# Patient Record
Sex: FEMALE | Race: BLACK OR AFRICAN AMERICAN | NOT HISPANIC OR LATINO | URBAN - METROPOLITAN AREA
[De-identification: names, ages, dates, MRNs, and addresses within clinical notes are randomized per-mention and may not be internally consistent; named-entity substitution may affect disease eponyms.]

---

## 2022-09-22 ENCOUNTER — WEB ENCOUNTER (OUTPATIENT)
Dept: URBAN - METROPOLITAN AREA CLINIC 88 | Facility: CLINIC | Age: 52
End: 2022-09-22

## 2022-09-27 ENCOUNTER — OFFICE VISIT (OUTPATIENT)
Dept: URBAN - METROPOLITAN AREA CLINIC 88 | Facility: CLINIC | Age: 52
End: 2022-09-27
Payer: COMMERCIAL

## 2022-09-27 ENCOUNTER — LAB OUTSIDE AN ENCOUNTER (OUTPATIENT)
Dept: URBAN - METROPOLITAN AREA CLINIC 88 | Facility: CLINIC | Age: 52
End: 2022-09-27

## 2022-09-27 ENCOUNTER — DASHBOARD ENCOUNTERS (OUTPATIENT)
Age: 52
End: 2022-09-27

## 2022-09-27 VITALS
TEMPERATURE: 97.3 F | HEIGHT: 64 IN | SYSTOLIC BLOOD PRESSURE: 85 MMHG | DIASTOLIC BLOOD PRESSURE: 61 MMHG | BODY MASS INDEX: 33.63 KG/M2 | WEIGHT: 197 LBS | HEART RATE: 97 BPM

## 2022-09-27 DIAGNOSIS — Z12.11 COLON CANCER SCREENING: ICD-10-CM

## 2022-09-27 DIAGNOSIS — K59.00 CONSTIPATION, UNSPECIFIED CONSTIPATION TYPE: ICD-10-CM

## 2022-09-27 PROBLEM — 14760008: Status: ACTIVE | Noted: 2022-09-27

## 2022-09-27 PROCEDURE — 99203 OFFICE O/P NEW LOW 30 MIN: CPT | Performed by: REGISTERED NURSE

## 2022-09-27 RX ORDER — MECLIZINE HYDROCLORIDE 25 MG/1
TAKE 1 TABLET BY MOUTH THREE TIMES A DAY AS NEEDED TABLET ORAL
Qty: 30 EACH | Refills: 0 | Status: DISCONTINUED | COMMUNITY

## 2022-09-27 RX ORDER — FUROSEMIDE 20 MG/1
TABLET ORAL
Qty: 30 TABLET | Status: DISCONTINUED | COMMUNITY

## 2022-09-27 RX ORDER — VALACYCLOVIR 1 G/1
TAKE 1 TABLET BY MOUTH TWICE DAILY FOR 3 DAYS THEN TAKE ONCE DAILY TABLET, FILM COATED ORAL
Qty: 90 EACH | Refills: 0 | Status: DISCONTINUED | COMMUNITY

## 2022-09-27 RX ORDER — ATORVASTATIN CALCIUM 20 MG/1
TABLET ORAL
Qty: 90 TABLET | Status: ACTIVE | COMMUNITY

## 2022-09-27 RX ORDER — TOBRAMYCIN AND DEXAMETHASONE 3; 1 MG/ML; MG/ML
SUSPENSION/ DROPS OPHTHALMIC
Qty: 10 UNSPECIFIED | Status: ACTIVE | COMMUNITY

## 2022-09-27 RX ORDER — OLMESARTAN MEDOXOMIL, AMLODIPINE AND HYDROCHLOROTHIAZIDE 40; 10; 25 MG/1; MG/1; MG/1
TAKE 1 TABLET BY MOUTH DAILY TABLET, FILM COATED ORAL
Qty: 90 EACH | Refills: 0 | Status: ACTIVE | COMMUNITY

## 2022-09-27 RX ORDER — THYROID, PORCINE 60 MG/1
TAKE 1 TABLET BY MOUTH EVERY MORNING TABLET ORAL
Qty: 30 EACH | Refills: 0 | Status: ACTIVE | COMMUNITY

## 2022-09-27 RX ORDER — LEVOTHYROXINE SODIUM 0.07 MG/1
TABLET ORAL
Qty: 90 EACH | Refills: 0 | Status: ACTIVE | COMMUNITY

## 2022-09-27 NOTE — HPI-TODAY'S VISIT:
Pt presents for colon cancer screening. No prior colonoscopy. No family history of colon cancer. Pt denies any abdominal pain, diarrhea, rectal bleeding or weight loss. She has occasional constipation. Bowels typically move every 1-2 days.

## 2022-10-11 ENCOUNTER — OFFICE VISIT (OUTPATIENT)
Dept: URBAN - METROPOLITAN AREA SURGERY CENTER 23 | Facility: SURGERY CENTER | Age: 52
End: 2022-10-11

## 2022-11-09 ENCOUNTER — TELEPHONE ENCOUNTER (OUTPATIENT)
Dept: URBAN - METROPOLITAN AREA CLINIC 88 | Facility: CLINIC | Age: 52
End: 2022-11-09

## 2022-11-09 RX ORDER — SODIUM SULFATE, MAGNESIUM SULFATE, AND POTASSIUM CHLORIDE 17.75; 2.7; 2.25 G/1; G/1; G/1
12 TABLETS TABLET ORAL
Qty: 24 TABLETS | Refills: 0 | OUTPATIENT
Start: 2022-11-09 | End: 2022-11-10

## 2022-11-15 ENCOUNTER — WEB ENCOUNTER (OUTPATIENT)
Dept: URBAN - METROPOLITAN AREA SURGERY CENTER 23 | Facility: SURGERY CENTER | Age: 52
End: 2022-11-15

## 2022-11-21 ENCOUNTER — CLAIMS CREATED FROM THE CLAIM WINDOW (OUTPATIENT)
Dept: URBAN - METROPOLITAN AREA SURGERY CENTER 23 | Facility: SURGERY CENTER | Age: 52
End: 2022-11-21
Payer: COMMERCIAL

## 2022-11-21 ENCOUNTER — CLAIMS CREATED FROM THE CLAIM WINDOW (OUTPATIENT)
Dept: URBAN - METROPOLITAN AREA SURGERY CENTER 23 | Facility: SURGERY CENTER | Age: 52
End: 2022-11-21

## 2022-11-21 DIAGNOSIS — Z12.11 COLON CANCER SCREENING: ICD-10-CM

## 2022-11-21 DIAGNOSIS — K63.5 BENIGN COLON POLYP: ICD-10-CM

## 2022-11-21 PROCEDURE — 45385 COLONOSCOPY W/LESION REMOVAL: CPT | Performed by: INTERNAL MEDICINE

## 2022-11-21 PROCEDURE — G8907 PT DOC NO EVENTS ON DISCHARG: HCPCS | Performed by: INTERNAL MEDICINE

## 2022-11-21 RX ORDER — TOBRAMYCIN AND DEXAMETHASONE 3; 1 MG/ML; MG/ML
SUSPENSION/ DROPS OPHTHALMIC
Qty: 10 UNSPECIFIED | Status: ACTIVE | COMMUNITY

## 2022-11-21 RX ORDER — LEVOTHYROXINE SODIUM 0.07 MG/1
TABLET ORAL
Qty: 90 EACH | Refills: 0 | Status: ACTIVE | COMMUNITY

## 2022-11-21 RX ORDER — ATORVASTATIN CALCIUM 20 MG/1
TABLET ORAL
Qty: 90 TABLET | Status: ACTIVE | COMMUNITY

## 2022-11-21 RX ORDER — OLMESARTAN MEDOXOMIL, AMLODIPINE AND HYDROCHLOROTHIAZIDE 40; 10; 25 MG/1; MG/1; MG/1
TAKE 1 TABLET BY MOUTH DAILY TABLET, FILM COATED ORAL
Qty: 90 EACH | Refills: 0 | Status: ACTIVE | COMMUNITY

## 2022-11-21 RX ORDER — THYROID, PORCINE 60 MG/1
TAKE 1 TABLET BY MOUTH EVERY MORNING TABLET ORAL
Qty: 30 EACH | Refills: 0 | Status: ACTIVE | COMMUNITY